# Patient Record
Sex: FEMALE | Race: BLACK OR AFRICAN AMERICAN | NOT HISPANIC OR LATINO
[De-identification: names, ages, dates, MRNs, and addresses within clinical notes are randomized per-mention and may not be internally consistent; named-entity substitution may affect disease eponyms.]

---

## 2019-09-19 ENCOUNTER — FORM ENCOUNTER (OUTPATIENT)
Age: 59
End: 2019-09-19

## 2019-10-01 PROBLEM — Z00.00 ENCOUNTER FOR PREVENTIVE HEALTH EXAMINATION: Status: ACTIVE | Noted: 2019-10-01

## 2019-10-03 DIAGNOSIS — Z86.39 PERSONAL HISTORY OF OTHER ENDOCRINE, NUTRITIONAL AND METABOLIC DISEASE: ICD-10-CM

## 2019-10-03 DIAGNOSIS — Z82.61 FAMILY HISTORY OF ARTHRITIS: ICD-10-CM

## 2019-10-03 DIAGNOSIS — Z87.898 PERSONAL HISTORY OF OTHER SPECIFIED CONDITIONS: ICD-10-CM

## 2019-10-03 DIAGNOSIS — Z80.49 FAMILY HISTORY OF MALIGNANT NEOPLASM OF OTHER GENITAL ORGANS: ICD-10-CM

## 2019-10-03 DIAGNOSIS — Z82.5 FAMILY HISTORY OF ASTHMA AND OTHER CHRONIC LOWER RESPIRATORY DISEASES: ICD-10-CM

## 2019-10-03 DIAGNOSIS — Z80.3 FAMILY HISTORY OF MALIGNANT NEOPLASM OF BREAST: ICD-10-CM

## 2019-10-03 DIAGNOSIS — Z82.49 FAMILY HISTORY OF ISCHEMIC HEART DISEASE AND OTHER DISEASES OF THE CIRCULATORY SYSTEM: ICD-10-CM

## 2019-10-17 PROBLEM — Z82.61 FAMILY HISTORY OF ARTHRITIS: Status: ACTIVE | Noted: 2019-10-17

## 2019-10-17 PROBLEM — Z87.898 HISTORY OF INSOMNIA: Status: RESOLVED | Noted: 2019-10-17 | Resolved: 2019-10-17

## 2019-10-17 PROBLEM — Z86.39 HISTORY OF HYPERLIPIDEMIA: Status: RESOLVED | Noted: 2019-10-17 | Resolved: 2019-10-17

## 2019-10-17 PROBLEM — Z82.5 FAMILY HISTORY OF ASTHMA: Status: ACTIVE | Noted: 2019-10-17

## 2019-10-17 PROBLEM — Z82.49 FAMILY HISTORY OF CARDIAC DISORDER: Status: ACTIVE | Noted: 2019-10-17

## 2019-10-17 RX ORDER — ANASTROZOLE TABLETS 1 MG/1
1 TABLET ORAL
Refills: 0 | Status: ACTIVE | COMMUNITY

## 2019-10-17 RX ORDER — MEGESTROL ACETATE 20 MG/1
20 TABLET ORAL
Refills: 0 | Status: ACTIVE | COMMUNITY

## 2019-10-17 RX ORDER — ZOLPIDEM TARTRATE 5 MG/1
5 TABLET, FILM COATED ORAL
Refills: 0 | Status: ACTIVE | COMMUNITY

## 2019-10-18 ENCOUNTER — TRANSCRIPTION ENCOUNTER (OUTPATIENT)
Age: 59
End: 2019-10-18

## 2019-10-18 ENCOUNTER — APPOINTMENT (OUTPATIENT)
Dept: GASTROENTEROLOGY | Facility: CLINIC | Age: 59
End: 2019-10-18
Payer: COMMERCIAL

## 2019-10-18 VITALS
WEIGHT: 244 LBS | SYSTOLIC BLOOD PRESSURE: 134 MMHG | HEART RATE: 72 BPM | HEIGHT: 65 IN | DIASTOLIC BLOOD PRESSURE: 90 MMHG | BODY MASS INDEX: 40.65 KG/M2

## 2019-10-18 PROBLEM — Z80.3 FAMILY HISTORY OF MALIGNANT NEOPLASM OF BREAST: Status: ACTIVE | Noted: 2019-10-18

## 2019-10-18 PROBLEM — Z80.49 FAMILY HISTORY OF MALIGNANT NEOPLASM OF UTERUS: Status: ACTIVE | Noted: 2019-10-18

## 2019-10-18 PROCEDURE — 99244 OFF/OP CNSLTJ NEW/EST MOD 40: CPT

## 2019-10-18 NOTE — HISTORY OF PRESENT ILLNESS
[de-identified] : PCP: Brigido\par \par 57 yo F w h/o Breast Ca--s/p B/L Mastectomy & reconstruction & Chemo, HLD, Insomina\par GERD, Gastritis, Gastric Polyp,\par Hemorrhoids\par \par Today: feeling well, no cp/sob \par \par * Abd pain—no\par * Nausea—no\par * Vomit—no\par * Belching—no\par * Regurgitation—no\par * Ht burn—yes, at night take pepcid \par * Throat Clearing—no\par * Globus—no\par * Cough—no\par * Hoarseness—no\par * Dysphagia—no\par * BMs: # 3-4 q o d\par * Constipation—intermittent \par * Diarrhea—no\par * Bloating—no\par * Flatulence—no\par * Gurgling—no\par * Melena—no\par * BPBPR—no\par * Anorexia—no\par * Wt. Loss-no\par \par \par 9/29/15 EGD:  gastritis, No HP, duodenitis; 0.5cm Gastric Polyp: Fundic; HH, Esophagitis A-,  Inc Ring w ST component: carditis,

## 2019-10-18 NOTE — ASSESSMENT
[FreeTextEntry1] : \par \par 1. GERD:  active w  ht burn,  No dysphagia,  throat clear\par * No LPR,  No Martinez’s w No Dysplasia,  ++ Esophagitis grade: A-, +Inc Ring \par * Anti-reflux diet & life-style changes emphasized.  No  Bedge\par * Weight reduction & regular exercise emphasized\par * No PPI:  ,  ++ H2B q HS: Pepcid AC  ,  No Carafate  1 gram:\par * ++  F/U  EGD:  [    1  ] mo.  /  [   2020   ] yr--[facility] screen \par * No  pH Monitor,  No  Manometry,  No  Esophagram\par * No  ENT  eval/F/U,  No  Surgical  eval\par \par \par \par \par \par 2. Gastritis: well – controlled,  No N, V, early satiety, pain\par * No H.Pylori, No IM, No Bile, No NSAIDs\par * Avoid NSAIDs / ETOH\par * No Prevpac, No Pylera,\par * No PPI:  x 0 days,  No Carafate 1 gm QID\par * No F/U EGD  or  No UBT \par \par \par \par 3. Hemorrhoids:  well – controlled.  No pain,  swelling,  itch,  bleeding\par * Discussed the potential complications of thrombosis,  pain,  infection,  swelling, itching,  bleeding \par * High – Fiber Diet was reviewed and emphasized\par * 6  --  8 cups of decaffeinated fluid daily\par * Sitz Bathes BID,  No:  Anusol HC  Suppos / Cream  MN BID\par * No:  Tucks BID,   No:  Balneol Lotion,   No:  Calmoseptine Oint,   ++ Prep H prn\par * No:  Colorectal surgical evaluation for possible ablation\par \par \par \par \par \par \par 4. Colorectal & Gastric Neoplasia  Screening:  to be evaluated\par * Discussed the pre-malignant potential of polyps\par * Discussed the importance of f/u surveillance / screening\par * High-Fiber Diet was reviewed and emphasized\par * Anti-oxidants and ASA/NSAID Therapy reviewed\par * Given age > 45-49 yo & +PH Breast Ca &  +PH Gastric polyps \par * Recommend Colonoscopy--2020;  EGD--9/2018 --due \par * R/O  Colonic Neoplasia\par \par \par \par \par Informed Consent:\par * The risks & Benefits of EGD  /  Colonoscopy were discussed w patient.\par * This included but was not limited to perforation, bleeding, sedation /med rxns possibly requiring surgery, blood transfusions, antibiotics & CPR/Intubation.\par * Pt. understands & agrees to the procedures.\par * Pt. advised to D/C  ASA/NSAIDs  7  Days  &  Plavix, Warfarin,  4  -  5  Days  PTP.\par * [ +++ ]  Dulcolax / Miralax / Mag. Citrate ,  [     ] Prepopik/ Clenpiq ,  [     ] Osmo Prep,  [    ] GoLytely,  prep. reviewed w Pt.\par * Hold  [           ] AM of procedure.\par * Hold  [           ] PM of procedure.\par * Take  [           ] PM of procedure.\par * Take  [           ] AM of procedure.\par \par

## 2020-01-14 ENCOUNTER — RESULT REVIEW (OUTPATIENT)
Age: 60
End: 2020-01-14

## 2020-01-15 ENCOUNTER — APPOINTMENT (OUTPATIENT)
Dept: GASTROENTEROLOGY | Facility: HOSPITAL | Age: 60
End: 2020-01-15

## 2020-03-26 ENCOUNTER — FORM ENCOUNTER (OUTPATIENT)
Age: 60
End: 2020-03-26

## 2020-09-24 ENCOUNTER — FORM ENCOUNTER (OUTPATIENT)
Age: 60
End: 2020-09-24

## 2021-03-19 ENCOUNTER — APPOINTMENT (OUTPATIENT)
Dept: BREAST CENTER | Facility: CLINIC | Age: 61
End: 2021-03-19

## 2021-03-24 ENCOUNTER — APPOINTMENT (OUTPATIENT)
Dept: BREAST CENTER | Facility: CLINIC | Age: 61
End: 2021-03-24

## 2021-05-07 ENCOUNTER — NON-APPOINTMENT (OUTPATIENT)
Age: 61
End: 2021-05-07

## 2021-06-17 DIAGNOSIS — Z78.9 OTHER SPECIFIED HEALTH STATUS: ICD-10-CM

## 2021-06-22 ENCOUNTER — APPOINTMENT (OUTPATIENT)
Dept: BREAST CENTER | Facility: CLINIC | Age: 61
End: 2021-06-22
Payer: COMMERCIAL

## 2021-06-22 VITALS
WEIGHT: 250 LBS | HEIGHT: 65 IN | HEART RATE: 110 BPM | BODY MASS INDEX: 41.65 KG/M2 | DIASTOLIC BLOOD PRESSURE: 101 MMHG | SYSTOLIC BLOOD PRESSURE: 156 MMHG | OXYGEN SATURATION: 99 %

## 2021-06-22 DIAGNOSIS — I10 ESSENTIAL (PRIMARY) HYPERTENSION: ICD-10-CM

## 2021-06-22 DIAGNOSIS — Z85.3 PERSONAL HISTORY OF MALIGNANT NEOPLASM OF BREAST: ICD-10-CM

## 2021-06-22 PROCEDURE — 99072 ADDL SUPL MATRL&STAF TM PHE: CPT

## 2021-06-22 PROCEDURE — 99213 OFFICE O/P EST LOW 20 MIN: CPT

## 2021-06-22 RX ORDER — DILTIAZEM HYDROCHLORIDE 90 MG/1
TABLET ORAL
Refills: 0 | Status: ACTIVE | COMMUNITY

## 2021-06-22 NOTE — PAST MEDICAL HISTORY
[Postmenopausal] : The patient is postmenopausal [Menarche Age ____] : age at menarche was [unfilled] [Total Preg ___] : G[unfilled] [Live Births ___] : P[unfilled]  [Abortions ___] : Abortions:[unfilled] [History of Hormone Replacement Treatment] : has no history of hormone replacement treatment

## 2021-06-22 NOTE — REASON FOR VISIT
[Follow-Up: _____] : a [unfilled] follow-up visit [FreeTextEntry1] : The patient comes in with a strong family history of breast cancer as well as a history of Hodgkin's lymphoma in the family.  She herself was diagnosed with a left breast upper outer quadrant moderately differentiated invasive duct cancer in February 2017 which was ER/WA positive HER-2/larissa negative which was localized.  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  MammaPrint showed a high risk luminal B subtype and she was placed on chemotherapy by Dr. Soares which finished in August 2017.  She then underwent bilateral nipple sparing mastectomies with bilateral CELESTINA flap reconstructions by Dr. German on November 20, 2017.  Final pathology was all benign and she was started on Arimidex in January 2018.  She has had some revision surgery and comes in for routine follow-up.

## 2021-06-22 NOTE — PHYSICAL EXAM
[Normocephalic] : normocephalic [Atraumatic] : atraumatic [EOMI] : extra ocular movement intact [Supple] : supple [No Supraclavicular Adenopathy] : no supraclavicular adenopathy [No Cervical Adenopathy] : no cervical adenopathy [Examined in the supine and seated position] : examined in the supine and seated position [No dominant masses] : no dominant masses in right breast  [No dominant masses] : no dominant masses left breast [No Nipple Retraction] : no left nipple retraction [No Nipple Discharge] : no left nipple discharge [Breast Mass Right Breast ___cm] : no masses [Breast Mass Left Breast ___cm] : no masses [Breast Nipple Inversion] : nipples not inverted [Breast Nipple Retraction] : nipples not retracted [Breast Nipple Flattening] : nipples not flattened [Breast Nipple Fissures] : nipples not fissured [Breast Abnormal Lactation (Galactorrhea)] : no galactorrhea [Breast Abnormal Secretion Bloody Fluid] : no bloody discharge [Breast Abnormal Secretion Serous Fluid] : no serous discharge [Breast Abnormal Secretion Opalescent Fluid] : no milky discharge [No Axillary Lymphadenopathy] : no left axillary lymphadenopathy [No Edema] : no edema [No Rashes] : no rashes [No Ulceration] : no ulceration [de-identified] : On exam, the patient has obvious bilateral nipple sparing mastectomies with bilateral CELESTINA flap reconstructions.  She has an excellent result.  I cannot feel any suspicious findings in either flap.  Her abdominal wound is healed nicely but she does have some tenderness centrally in the wound from scarring.  She has no axillary, supraclavicular, or cervical adenopathy.  She has no sensation in either nipple areolar complex or in the inferior poles.  She has good sensation on the superior and medial poles and decreased sensation on the lateral poles. [de-identified] : Status post nipple sparing mastectomy and CELESTINA flap reconstruction with no suspicious findings. [de-identified] : Status post nipple sparing mastectomy and CELESTINA flap reconstruction with no evidence of recurrence

## 2021-06-22 NOTE — HISTORY OF PRESENT ILLNESS
[FreeTextEntry1] : The patient is a 60-year-old  postmenopausal female of -American descent.  She underwent menarche at age 13.  She has no history of any hormone replacement therapy.  She has a strong family history with her mother who had breast cancer at age 50 and then had Hodgkin's lymphoma at age 74.  Her sister had breast cancer at age 43 and and had a recurrence at age 55 and was BRCA negative.  She has a maternal aunt who had Hodgkin's lymphoma.  The patient underwent a screening mammography on February 10, 2017 which showed a new left breast upper outer quadrant density.  Ultrasound confirmed a 9 mm hypoechoic density in the 2:00 region 7 cm from the nipple.  She underwent an ultrasound-guided core biopsy on 2017 which showed a moderately differentiated invasive duct cancer which was ER positive at 80%, MT positive at 80%, HER-2/larissa negative by IHC with a Ki-67 of 30%.  MRI performed on 2017 showed a localized cancer with no multifocal or contralateral disease.  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  She then underwent a left breast partial mastectomy and bilateral mastopexy reduction surgery in 2017 and final pathology showed 3 negative left axillary sentinel lymph nodes and the cancer measured 9 mm and there was no lymphovascular invasion.  All her margins were free.  This was a pathologic prognostic stage IA breast cancer.  MammaPrint showed a high risk luminal B subtype and she was referred to Dr. Soares and underwent chemotherapy which finished at the end of 2017.  She then underwent bilateral nipple sparing mastectomies with bilateral CELESTINA flap reconstructions by Dr. German given her HARMONY mutation on 2017.  The final pathology was completely benign.  She was started on Arimidex in 2018.  She did undergo some revision surgery of her CELESTINA flap on 2018.  She comes in for routine follow-up.

## 2021-06-22 NOTE — REVIEW OF SYSTEMS
[Eyesight Problems] : eyesight problems [Heart Rate Is Fast] : fast heart rate [Negative] : Heme/Lymph

## 2021-06-22 NOTE — ASSESSMENT
[FreeTextEntry1] : The patient is a 60-year-old  postmenopausal female of -American descent.  She underwent menarche at age 13.  She has no history of any hormone replacement therapy.  She has a strong family history with her mother who had breast cancer at age 50 and then had Hodgkin's lymphoma at age 74.  Her sister had breast cancer at age 43 and and had a recurrence at age 55 and was BRCA negative.  She has a maternal aunt who had Hodgkin's lymphoma.  The patient underwent a screening mammography on February 10, 2017 which showed a new left breast upper outer quadrant density.  Ultrasound confirmed a 9 mm hypoechoic density in the 2:00 region 7 cm from the nipple.  She underwent an ultrasound-guided core biopsy on 2017 which showed a moderately differentiated invasive duct cancer which was ER positive at 80%, MT positive at 80%, HER-2/larsisa negative by IHC with a Ki-67 of 30%.  MRI performed on 2017 showed a localized cancer with no multifocal or contralateral disease.  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  She then underwent a left breast partial mastectomy and bilateral mastopexy reduction surgery in 2017 and final pathology showed 3 negative left axillary sentinel lymph nodes and the cancer measured 9 mm and there was no lymphovascular invasion.  All her margins were free.  This was a pathologic prognostic stage IA breast cancer.  MammaPrint showed a high risk luminal B subtype and she was referred to Dr. Soares and underwent chemotherapy which finished at the end of 2017.  She then underwent bilateral nipple sparing mastectomies with bilateral CELESTINA flap reconstructions by Dr. German given her HARMONY mutation on 2017.  The final pathology was completely benign.  She was started on Arimidex in 2018.  She did undergo some revision surgery of her CELESTINA flap on 2018.  On exam today she has no suspicious findings in either CELESTINA flap reconstruction with no evidence of recurrence on the left side.  She should continue on Arimidex and I would like to see her again in 1 year.

## 2022-06-27 ENCOUNTER — APPOINTMENT (OUTPATIENT)
Dept: BREAST CENTER | Facility: CLINIC | Age: 62
End: 2022-06-27
Payer: COMMERCIAL

## 2022-06-27 PROCEDURE — 99213 OFFICE O/P EST LOW 20 MIN: CPT

## 2022-06-27 NOTE — HISTORY OF PRESENT ILLNESS
[FreeTextEntry1] : The patient is a 61-year-old  postmenopausal female of -American descent.  She underwent menarche at age 13.  She has no history of any hormone replacement therapy.  She has a strong family history with her mother who had breast cancer at age 50 and then had Hodgkin's lymphoma at age 74.  Her sister had breast cancer at age 43 and and had a recurrence at age 55 and was BRCA negative.  She has a maternal aunt who had Hodgkin's lymphoma.  The patient underwent a screening mammography on February 10, 2017 which showed a new left breast upper outer quadrant density.  Ultrasound confirmed a 9 mm hypoechoic density in the 2:00 region 7 cm from the nipple.  She underwent an ultrasound-guided core biopsy on 2017 which showed a moderately differentiated invasive duct cancer which was ER positive at 80%, FL positive at 80%, HER-2/larissa negative by IHC with a Ki-67 of 30%.  MRI performed on 2017 showed a localized cancer with no multifocal or contralateral disease.  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  She then underwent a left breast partial mastectomy and bilateral mastopexy reduction surgery in 2017 and final pathology showed 3 negative left axillary sentinel lymph nodes and the cancer measured 9 mm and there was no lymphovascular invasion.  All her margins were free.  This was a pathologic prognostic stage IA breast cancer.  MammaPrint showed a high risk luminal B subtype and she was referred to Dr. Soares and underwent chemotherapy which finished at the end of 2017.  She then underwent bilateral nipple sparing mastectomies with bilateral CELESTINA flap reconstructions by Dr. German given her HARMONY mutation on 2017.  The final pathology was completely benign.  She was started on Arimidex in 2018.  She did undergo some revision surgery of her CELESTINA flap on 2018.  She comes in for routine follow-up.

## 2022-06-27 NOTE — REASON FOR VISIT
[Follow-Up: _____] : a [unfilled] follow-up visit [FreeTextEntry1] : The patient comes in with a strong family history of breast cancer as well as a history of Hodgkin's lymphoma in the family.  She herself was diagnosed with a left breast upper outer quadrant moderately differentiated invasive duct cancer in February 2017 which was ER/UT positive HER-2/larissa negative which was localized.  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  MammaPrint showed a high risk luminal B subtype and she was placed on chemotherapy by Dr. Soares which finished in August 2017.  She then underwent bilateral nipple sparing mastectomies with bilateral CELESTINA flap reconstructions by Dr. German on November 20, 2017.  Final pathology was all benign and she was started on Arimidex in January 2018.  She has had some revision surgery and comes in for routine follow-up.

## 2022-06-27 NOTE — PHYSICAL EXAM
[Normocephalic] : normocephalic [Atraumatic] : atraumatic [EOMI] : extra ocular movement intact [Supple] : supple [No Supraclavicular Adenopathy] : no supraclavicular adenopathy [No Cervical Adenopathy] : no cervical adenopathy [Examined in the supine and seated position] : examined in the supine and seated position [No dominant masses] : no dominant masses in right breast  [No dominant masses] : no dominant masses left breast [No Nipple Retraction] : no left nipple retraction [No Nipple Discharge] : no left nipple discharge [Breast Mass Right Breast ___cm] : no masses [Breast Mass Left Breast ___cm] : no masses [No Axillary Lymphadenopathy] : no left axillary lymphadenopathy [No Edema] : no edema [No Rashes] : no rashes [No Ulceration] : no ulceration [Breast Nipple Inversion] : nipples not inverted [Breast Nipple Retraction] : nipples not retracted [Breast Nipple Flattening] : nipples not flattened [Breast Nipple Fissures] : nipples not fissured [Breast Abnormal Lactation (Galactorrhea)] : no galactorrhea [Breast Abnormal Secretion Bloody Fluid] : no bloody discharge [Breast Abnormal Secretion Serous Fluid] : no serous discharge [Breast Abnormal Secretion Opalescent Fluid] : no milky discharge [de-identified] : On exam, the patient has obvious bilateral nipple sparing mastectomies with bilateral CELESTINA flap reconstructions.  She has an excellent result.  I cannot feel any suspicious findings in either flap.  Her abdominal wound healed nicely but she does have some tenderness centrally in the wound from scarring.  She has no axillary, supraclavicular, or cervical adenopathy.  She has no sensation in either nipple areolar complex or in the inferior poles.  She has good sensation on the superior and medial poles and decreased sensation on the lateral poles. [de-identified] : Status post nipple sparing mastectomy and CELESTINA flap reconstruction with no suspicious findings. [de-identified] : Status post nipple sparing mastectomy and CELESTINA flap reconstruction with no evidence of recurrence

## 2022-06-27 NOTE — ASSESSMENT
[FreeTextEntry1] : The patient is a 61-year-old  postmenopausal female of -American descent.  She underwent menarche at age 13.  She has no history of any hormone replacement therapy.  She has a strong family history with her mother who had breast cancer at age 50 and then had Hodgkin's lymphoma at age 74.  Her sister had breast cancer at age 43 and and had a recurrence at age 55 and was BRCA negative.  She has a maternal aunt who had Hodgkin's lymphoma.  The patient underwent a screening mammography on February 10, 2017 which showed a new left breast upper outer quadrant density.  Ultrasound confirmed a 9 mm hypoechoic density in the 2:00 region 7 cm from the nipple.  She underwent an ultrasound-guided core biopsy on 2017 which showed a moderately differentiated invasive duct cancer which was ER positive at 80%, [place] positive at 80%, HER-2/larissa negative by IHC with a Ki-67 of 30%.  MRI performed on 2017 showed a localized cancer with no multifocal or contralateral disease.  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  She then underwent a left breast partial mastectomy and bilateral mastopexy reduction surgery in 2017 and final pathology showed 3 negative left axillary sentinel lymph nodes and the cancer measured 9 mm and there was no lymphovascular invasion.  All her margins were free.  This was a pathologic prognostic stage IA breast cancer.  MammaPrint showed a high risk luminal B subtype and she was referred to Dr. Soares and underwent chemotherapy which finished at the end of 2017.  She then underwent bilateral nipple sparing mastectomies with bilateral CELESTINA flap reconstructions by Dr. German given her HARMONY mutation on 2017.  The final pathology was completely benign.  She was started on Arimidex in 2018.  She did undergo some revision surgery of her CELESTINA flap on 2018.  On exam today, she has no suspicious findings in either CELESTINA flap reconstruction with no evidence of recurrence on the left side.  She should continue on Arimidex and I would like to see her again in 1 year.

## 2022-07-11 ENCOUNTER — TRANSCRIPTION ENCOUNTER (OUTPATIENT)
Age: 62
End: 2022-07-11

## 2023-07-12 ENCOUNTER — NON-APPOINTMENT (OUTPATIENT)
Age: 63
End: 2023-07-12

## 2023-07-20 ENCOUNTER — APPOINTMENT (OUTPATIENT)
Dept: BREAST CENTER | Facility: CLINIC | Age: 63
End: 2023-07-20
Payer: COMMERCIAL

## 2023-07-20 VITALS
HEIGHT: 65 IN | OXYGEN SATURATION: 98 % | BODY MASS INDEX: 37.82 KG/M2 | HEART RATE: 96 BPM | SYSTOLIC BLOOD PRESSURE: 107 MMHG | WEIGHT: 227 LBS | DIASTOLIC BLOOD PRESSURE: 76 MMHG

## 2023-07-20 DIAGNOSIS — Z15.01 GENETIC SUSCEPTIBILITY TO MALIGNANT NEOPLASM OF BREAST: ICD-10-CM

## 2023-07-20 DIAGNOSIS — Z90.13 ACQUIRED ABSENCE OF BILATERAL BREASTS AND NIPPLES: ICD-10-CM

## 2023-07-20 DIAGNOSIS — Z85.3 PERSONAL HISTORY OF MALIGNANT NEOPLASM OF BREAST: ICD-10-CM

## 2023-07-20 DIAGNOSIS — Z80.3 FAMILY HISTORY OF MALIGNANT NEOPLASM OF BREAST: ICD-10-CM

## 2023-07-20 PROCEDURE — 99213 OFFICE O/P EST LOW 20 MIN: CPT

## 2023-07-20 NOTE — PHYSICAL EXAM
[Normocephalic] : normocephalic [Atraumatic] : atraumatic [EOMI] : extra ocular movement intact [Supple] : supple [No Supraclavicular Adenopathy] : no supraclavicular adenopathy [No Cervical Adenopathy] : no cervical adenopathy [Examined in the supine and seated position] : examined in the supine and seated position [No dominant masses] : no dominant masses in right breast  [No dominant masses] : no dominant masses left breast [No Nipple Retraction] : no left nipple retraction [No Nipple Discharge] : no left nipple discharge [Breast Mass Right Breast ___cm] : no masses [Breast Mass Left Breast ___cm] : no masses [No Axillary Lymphadenopathy] : no left axillary lymphadenopathy [No Edema] : no edema [No Rashes] : no rashes [No Ulceration] : no ulceration [Breast Nipple Inversion] : nipples not inverted [Breast Nipple Retraction] : nipples not retracted [Breast Nipple Flattening] : nipples not flattened [Breast Nipple Fissures] : nipples not fissured [Breast Abnormal Lactation (Galactorrhea)] : no galactorrhea [Breast Abnormal Secretion Bloody Fluid] : no bloody discharge [Breast Abnormal Secretion Serous Fluid] : no serous discharge [Breast Abnormal Secretion Opalescent Fluid] : no milky discharge [de-identified] : On exam, the patient has obvious bilateral nipple sparing mastectomies with bilateral CELESTINA flap reconstructions.  She has an excellent result.  I cannot feel any suspicious findings in either flap.  Her abdominal wound healed nicely but she does have some tenderness centrally in the wound from scarring.  She has no axillary, supraclavicular, or cervical adenopathy.  She has no sensation in either nipple areolar complex or in the inferior poles.  She has good sensation on the superior and medial poles and decreased sensation on the lateral poles. [de-identified] : Status post nipple sparing mastectomy and CELESTINA flap reconstruction with no suspicious findings. [de-identified] : Status post nipple sparing mastectomy and CELESTINA flap reconstruction with no evidence of recurrence

## 2023-07-20 NOTE — ASSESSMENT
[FreeTextEntry1] : The patient is a 62-year-old  postmenopausal female of -American descent.  She underwent menarche at age 13.  She has no history of any hormone replacement therapy.  She has a strong family history with her mother who had breast cancer at age 50 and then had Hodgkin's lymphoma at age 74.  Her sister had breast cancer at age 43 and and had a recurrence at age 55 and was BRCA negative.  She has a maternal aunt who had Hodgkin's lymphoma.  The patient underwent a screening mammography on February 10, 2017 which showed a new left breast upper outer quadrant density.  Ultrasound confirmed a 9 mm hypoechoic density in the 2:00 region 7 cm from the nipple.  She underwent an ultrasound-guided core biopsy on 2017 which showed a moderately differentiated invasive duct cancer which was ER positive at 80%, LA positive at 80%, HER-2/larissa negative by IHC with a Ki-67 of 30%.  MRI performed on 2017 showed a localized cancer with no multifocal or contralateral disease.  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  She then underwent a left breast partial mastectomy and bilateral mastopexy reduction surgery in 2017 and final pathology showed 3 negative left axillary sentinel lymph nodes and the cancer measured 9 mm and there was no lymphovascular invasion.  All her margins were free.  This was a pathologic prognostic stage IA breast cancer.  MammaPrint showed a high risk luminal B subtype and she was referred to Dr. Soares and underwent chemotherapy which finished at the end of 2017.  She then underwent bilateral nipple sparing mastectomies with bilateral CELESTINA flap reconstructions by Dr. German given her HARMONY mutation on 2017.  The final pathology was completely benign.  She was started on Arimidex in 2018.  She did undergo some revision surgery of her CELESTINA flap on 2018.  On exam today, she has no suspicious findings in either CELESTINA flap reconstruction with no evidence of recurrence on the left side.  She should continue on Arimidex and now follows up with Dr. Smith and I would like to see her again in 1 year.

## 2023-11-20 ENCOUNTER — NON-APPOINTMENT (OUTPATIENT)
Age: 63
End: 2023-11-20

## 2023-12-11 ENCOUNTER — APPOINTMENT (OUTPATIENT)
Dept: GASTROENTEROLOGY | Facility: CLINIC | Age: 63
End: 2023-12-11
Payer: COMMERCIAL

## 2023-12-11 VITALS
HEART RATE: 84 BPM | SYSTOLIC BLOOD PRESSURE: 118 MMHG | WEIGHT: 227 LBS | HEIGHT: 65 IN | DIASTOLIC BLOOD PRESSURE: 66 MMHG | BODY MASS INDEX: 37.82 KG/M2

## 2023-12-11 DIAGNOSIS — K64.8 OTHER HEMORRHOIDS: ICD-10-CM

## 2023-12-11 DIAGNOSIS — K57.30 DIVERTICULOSIS OF LARGE INTESTINE W/OUT PERFORATION OR ABSCESS W/OUT BLEEDING: ICD-10-CM

## 2023-12-11 DIAGNOSIS — K29.50 UNSPECIFIED CHRONIC GASTRITIS W/OUT BLEEDING: ICD-10-CM

## 2023-12-11 DIAGNOSIS — E66.9 OBESITY, UNSPECIFIED: ICD-10-CM

## 2023-12-11 DIAGNOSIS — Z12.11 ENCOUNTER FOR SCREENING FOR MALIGNANT NEOPLASM OF COLON: ICD-10-CM

## 2023-12-11 DIAGNOSIS — R73.03 PREDIABETES.: ICD-10-CM

## 2023-12-11 DIAGNOSIS — K22.70 BARRETT'S ESOPHAGUS W/OUT DYSPLASIA: ICD-10-CM

## 2023-12-11 DIAGNOSIS — K21.00 GASTRO-ESOPHAGEAL REFLUX DISEASE WITH ESOPHAGITIS, WITHOUT BLEEDING: ICD-10-CM

## 2023-12-11 DIAGNOSIS — Z86.010 PERSONAL HISTORY OF COLONIC POLYPS: ICD-10-CM

## 2023-12-11 DIAGNOSIS — K31.7 POLYP OF STOMACH AND DUODENUM: ICD-10-CM

## 2023-12-11 PROCEDURE — 99215 OFFICE O/P EST HI 40 MIN: CPT

## 2023-12-11 RX ORDER — DILTIAZEM HYDROCHLORIDE 240 MG/1
240 CAPSULE, COATED, EXTENDED RELEASE ORAL
Refills: 0 | Status: ACTIVE | COMMUNITY

## 2023-12-11 RX ORDER — ATORVASTATIN CALCIUM 10 MG/1
10 TABLET, FILM COATED ORAL
Refills: 0 | Status: ACTIVE | COMMUNITY

## 2023-12-11 RX ORDER — METOPROLOL SUCCINATE 50 MG/1
50 TABLET, EXTENDED RELEASE ORAL
Refills: 0 | Status: ACTIVE | COMMUNITY

## 2023-12-11 RX ORDER — METOPROLOL SUCCINATE 100 MG/1
100 TABLET, EXTENDED RELEASE ORAL
Refills: 0 | Status: ACTIVE | COMMUNITY

## 2023-12-11 RX ORDER — SEMAGLUTIDE 1.34 MG/ML
2 INJECTION, SOLUTION SUBCUTANEOUS
Refills: 0 | Status: ACTIVE | COMMUNITY

## 2024-01-18 ENCOUNTER — APPOINTMENT (OUTPATIENT)
Dept: GASTROENTEROLOGY | Facility: CLINIC | Age: 64
End: 2024-01-18

## 2024-02-09 ENCOUNTER — APPOINTMENT (OUTPATIENT)
Dept: GASTROENTEROLOGY | Facility: HOSPITAL | Age: 64
End: 2024-02-09

## 2024-02-25 ENCOUNTER — RX RENEWAL (OUTPATIENT)
Age: 64
End: 2024-02-25

## 2024-02-25 RX ORDER — OMEPRAZOLE 40 MG/1
40 CAPSULE, DELAYED RELEASE ORAL
Qty: 90 | Refills: 1 | Status: ACTIVE | COMMUNITY
Start: 2023-12-11 | End: 1900-01-01

## 2024-04-18 ENCOUNTER — RESULT REVIEW (OUTPATIENT)
Age: 64
End: 2024-04-18

## 2024-04-19 ENCOUNTER — APPOINTMENT (OUTPATIENT)
Dept: GASTROENTEROLOGY | Facility: HOSPITAL | Age: 64
End: 2024-04-19

## 2024-10-31 ENCOUNTER — RX RENEWAL (OUTPATIENT)
Age: 64
End: 2024-10-31